# Patient Record
(demographics unavailable — no encounter records)

---

## 2025-04-29 NOTE — PHYSICAL EXAM
[No Acute Distress] : no acute distress [Normal Voice/Communication] : normal voice/communication [No Respiratory Distress] : no respiratory distress  [No Accessory Muscle Use] : no accessory muscle use [Normal] : affect was normal and insight and judgment were intact [de-identified] : Limited PE due to telephone visit.  [de-identified] : speaking in full sentences - speech is clear

## 2025-04-29 NOTE — ASSESSMENT
[FreeTextEntry1] : 71-year-old female with a history of hypertension, hyperlipidemia, CHF, atrial fibrillation (on apixaban), and a pacemaker presented to [facility] with worsening shortness of breath, gradually worsening lower extremity edema,  Pt presented with acute on chronic systolic congestive heart failure. Echo with EF 18%, reduced LV/RV function, overloaded RA, severe TR, moderate MR. S/p diuresis with lasix 40 IV BID at [facility], currently euvolemic, lungs clear and trace LE edema on PO lasix. Remains stable on RA. Entresto held at OSH for hypotension, GDMT optimized by HF team. Pt a/w pulmonary hypertension noted on TTE. Pt to f/u pulm Dr. Mittal outpt. Pt w/ascites, s/p large volume paracentesis of 12L on 4/4. Ascites likely iso of severe right-sided heart failure. Prior US showed liver wnl and cholelithiasis, LFTs monitored. Pt to f/u PCP outpt.

## 2025-04-29 NOTE — REVIEW OF SYSTEMS
[Vision Problems] : vision problems [Lower Ext Edema] : lower extremity edema [Fever] : no fever [Chills] : no chills [Fatigue] : no fatigue [Hearing Loss] : no hearing loss [Chest Pain] : no chest pain [Palpitations] : no palpitations [Shortness Of Breath] : no shortness of breath [Cough] : no cough [Abdominal Pain] : no abdominal pain [Nausea] : no nausea [Constipation] : no constipation [Vomiting] : no vomiting [Heartburn] : no heartburn [Incontinence] : no incontinence [Joint Pain] : no joint pain [Muscle Pain] : no muscle pain [Itching] : no itching [Skin Rash] : no skin rash [Headache] : no headache [Memory Loss] : no memory loss [Unsteady Walking] : no ataxia [Insomnia] : no insomnia [Anxiety] : no anxiety [Depression] : no depression [Easy Bleeding] : no easy bleeding [FreeTextEntry3] : Reading glasses [FreeTextEntry5] : decrease

## 2025-04-29 NOTE — HISTORY OF PRESENT ILLNESS
[Home] : at home, [unfilled] , at the time of the visit. [Other Location: e.g. Home (Enter Location, City,State)___] : at [unfilled] [Telephone (audio)] : This telephonic visit was provided via audio only technology. [Verbal consent obtained from patient] : the patient, [unfilled] [FreeTextEntry1] : F/u post hospitalization at [facility] from 4/13-15 for HF and PULM HTN  [de-identified] : 71-year-old female with a history of hypertension, hyperlipidemia, CHF, atrial fibrillation (on apixaban), and a pacemaker presented to [facility] with worsening shortness of breath, gradually worsening lower extremity edema, and abdominal distension for months iso of medication non-compliance, particularly with diuretics. Initial workup at [facility] showed ELIOT with creatinine of 1.51, and proBNP of 29,570.  Chest x-ray showed cardiomegaly and mild pulmonary congestion.  Echocardiogram revealed an LVEF of 18%, moderate mitral regurgitation, severe tricuspid regurgitation, severe pulmonary hypertension, and decreased right ventricular function.  A large left pleural effusion was also noted on echo. The patient was treated for acute on chronic systolic heart failure with IV Lasix BID, resulting in good diuresis and stabilization of creatinine. Underwent large volume paracentesis of 12L on 4/4. Addition of GDMT was limited due to hypotension. She was transferred [facility] for further heart failure and pulmonary hypertension evaluation by the respective specialty teams. Today the patient reports much improvement in her abdominal distention and LE edema from initial admission and has no acute concerns.  (13 Apr 2025 11:06) Hospital Course: Pt presented with acute on chronic systolic congestive heart failure. Echo with EF 18%, reduced LV/RV function, overloaded RA, severe TR, moderate MR. S/p diuresis with lasix 40 IV BID at [facility], currently euvolemic, lungs clear and trace LE edema on PO lasix. Remains stable on RA. Entresto held at OSH for hypotension, GDMT optimized by HF team. Pt a/w pulmonary hypertension noted on TTE. Pt to f/u pulm Dr. Mittal outpt. Pt w/ ascites,  s/p large volume paracentesis of 12L on 4/4. Ascites likely iso of severe right-sided heart failure. Prior US showed liver wnl and cholelithiasis, LFTs monitored. Pt to f/u PCP outpt.

## 2025-04-29 NOTE — PHYSICAL EXAM
[No Acute Distress] : no acute distress [Normal Voice/Communication] : normal voice/communication [No Respiratory Distress] : no respiratory distress  [No Accessory Muscle Use] : no accessory muscle use [Normal] : affect was normal and insight and judgment were intact [de-identified] : Limited PE due to telephone visit.  [de-identified] : speaking in full sentences - speech is clear

## 2025-05-01 NOTE — REVIEW OF SYSTEMS
[Fever] : no fever [Fatigue] : fatigue [Recent Wt Loss (___ Lbs)] : ~T recent [unfilled] lb weight loss [Epistaxis] : no epistaxis [Nasal Congestion] : no nasal congestion [Postnasal Drip] : no postnasal drip [Cough] : no cough [Hemoptysis] : no hemoptysis [Dyspnea] : dyspnea [Wheezing] : no wheezing [SOB on Exertion] : sob on exertion [Edema] : edema [Leg Cramps] : leg cramps [Orthopnea] : orthopnea [GERD] : gerd [Abdominal Pain] : abdominal pain [Nausea] : no nausea [Vomiting] : no vomiting [Hepatic Disease] : hepatic disease [Myalgias] : no myalgias [Anemia] : anemia [Headache] : no headache [Focal Weakness] : no focal weakness [Seizures] : no seizures [Head Injury] : no head injury [Dizziness] : dizziness [TextBox_69] : ascites - s/p paracentesis (12L don 4/4/25)

## 2025-05-01 NOTE — DISCUSSION/SUMMARY
[FreeTextEntry1] : Ms Delacruz is a 70 y/o with h/o HFrEF (dx'ed in 2024, follows at [place]), s/p ICD, atrial fibrillation (Eliquis), HTN, DLP, ascites (s/p paracentesis 4/4), pHTN is here today for hospital f/u for cardiomyopathy.   She is ACC/AHA Stage C, NYHA Class III, appears euvolemic, normotensive and her symptoms appear to be compounded by both her pHTN and her HFrEF and will need to optimize her GDMTs and volume status at this time.

## 2025-05-01 NOTE — HISTORY OF PRESENT ILLNESS
[Never] : never [Class III - Marked Limitation in Activity] : III [TextBox_4] : 71-year-old female with a history of hypertension, hyperlipidemia, CHF, atrial  fibrillation (on apixaban), and a pacemaker presented to [facility] with worsening shortness of breath, gradually worsening lower extremity edema, and abdominal distension for months iso of medication non-compliance, particularly with diuretics. Initial workup at [facility] showed ELIOT with creatinine of 1.51, and proBNP of 29,570. Chest x-ray showed cardiomegaly and mild pulmonary congestion. Echocardiogram revealed an LVEF of 18%, moderate mitral regurgitation, severe tricuspid regurgitation, severe pulmonary hypertension, and decreased right ventricular function. A large left pleural effusion was also noted on echo. The patient was treated for acute on chronic systolic heart failure with IV Lasix BID, resulting in good diuresis and stabilization of creatinine. Underwent large volume paracentesis of 12L on 4/4. Addition of GDMT was limited due to hypotension. She was transferred to [facility] for further heart failure and pulmonary hypertension evaluation  Lifelong nonsmoker, retired  Has 2 children- currently lives w/her son -requires assistance w/transport  4/2025 echo 1. Left ventricular systolic function is severely decreased with an ejection fraction visually estimated at <20 %.  2. Multiple segmental abnormalities exist. See findings.  3. Mildly enlarged right ventricular cavity size and reduced right ventricular systolic function. Tricuspid annular plane systolic excursion (TAPSE) is 1.3 cm (normal >=1.7 cm).  4. Right ventricular free wall strain is --7 %.  5. Moderate tricuspid regurgitation.  6. Bilateral pleural effusion noted.  7. Compared to the transthoracic echocardiogram performed on 4/3/2025, PASP is lower.  8. Estimated pulmonary artery systolic pressure is 51 mmHg, consistent with moderate pulmonary hypertension.  CXR 4/2025 The cardiac silhouette is enlarged. There is a left chest wall ICD with an intact lead with tip in expected region of the right ventricle. The generator obscures part of the left chest. Mildly elevated right hemidiaphragm again seen. There is no significant pulmonary vascular congestion. The visualized lungs show no focal lung consolidation. There are bilateral trace pleural effusions. There is no pneumothorax. There is no acute bony abnormality.  IMPRESSION: Enlarged cardiac silhouette. No significant pulmonary vascular congestion.   Accession:                             [id number] Collected Date/Time:                   4/4/2025 09:55 EDT Received Date/Time:                    4/4/2025 15:11 EDT  Non-Gynecologic Report - Auth (Verified)  Specimen(s) Submitted PERITONEAL FLUID  Final Diagnosis PERITONEAL FLUID NEGATIVE FOR MALIGNANT CELLS.  5/1/2025 feels better since hospitalization afib/PPM- on eliquis/metoprolol- evaluated by cards today Dr Lemus/ DONATO ONEILL , [place] Ascites persists but improved post paracentesis- remains on lasix and aldactone - would benefit from Hepatology evaluation reports mild RITTER, ambulates w/walker but is very limitted appetite fair, is losing weight

## 2025-05-01 NOTE — PHYSICAL EXAM
[No Acute Distress] : no acute distress [Well Developed] : well developed [Normal Oropharynx] : normal oropharynx [Normal Appearance] : normal appearance [Supple] : supple [Normal Rate/Rhythm] : normal rate/rhythm [Normal S1, S2] : normal s1, s2 [No Resp Distress] : no resp distress [No Acc Muscle Use] : no acc muscle use [Clear to Auscultation Bilaterally] : clear to auscultation bilaterally [Not Tender] : not tender [Soft] : soft [No Clubbing] : no clubbing [No Cyanosis] : no cyanosis [No Edema] : no edema [Normal Color/ Pigmentation] : normal color/ pigmentation [No Focal Deficits] : no focal deficits [Oriented x3] : oriented x3 [Normal Affect] : normal affect [TextBox_11] : NCAT, EOMI, anicteric, MMM, no thrush [TextBox_68] : no wheeze, [TextBox_89] : distended, +fluid wave

## 2025-05-01 NOTE — CARDIOLOGY SUMMARY
[de-identified] : -4/14/25 TTE: LVEF <20%, mildly enlarged RV size and reduced function, TAPSE 1.3cm, mod TR, PASP 51, small pericardial effusion, bilateral pleural effusion noted, IVC 1.1,   -4/3/25 TTE: EF 18%, global hypokinesis, reduced RV function, moderate MR, severe TR, severe pHTN (PASP 66mmHg)

## 2025-05-01 NOTE — PHYSICAL EXAM
[Frail] : frail [Normal] : clear lung fields, good air entry, no respiratory distress [No Edema] : no edema [No Cyanosis] : no cyanosis [No Rash] : no rash [No Skin Lesions] : no skin lesions [Moves all extremities] : moves all extremities [Alert and Oriented] : alert and oriented [de-identified] : JVP 8 cm H20 [de-identified] : distended [de-identified] : slow gait with walker

## 2025-05-01 NOTE — ASSESSMENT
[FreeTextEntry1] : 71-year-old female with a history of hypertension, hyperlipidemia, CHF, atrial fibrillation (on apixaban), and a pacemaker, severe pulm htn , ascites s/p paracentesis  1) pulm htn- will ultimately need cardiac cath (RHC/LHC) to evaluate pulmonary pressures -keep euvolemic- continue lasix, aldactone. Instructed on daily weight - will check serologic workup today 2) needs CTA chest to evaluate for underlying thromboembolic disease - no reported history of VTE 3) ascites/ eleavted LFTs - needs hepatology evaluation - appears to have continued ascites on evaluation though states abdomen is much improved 4) labs drawn in office today 5) cards f/u w/ Dr LOREDO 6) needs EOT, 6mwt and PFT - to evaluate for underlying obstructive/restrictive lung disease, diffusion impairment, and exertional capacity and to r/o hypoxemia 7) needs assistance w/transportation- provided letter for access-a-ride 8)  f/u in one month - Will need outside records - patient denies having had prior pulmonary follow-up -  Today's medical care services serve as the continuing focal point for needed health care services that are part of ongoing care related to a patient's one or more serious conditions or a complex condition.    The above plan was discussed with ADONIS CHADWICK  in detail. Patient verbalized understanding and agrees with plan as detailed above. Patient was provided education and counselling on current diagnosis/symptoms, diagnostic work up, treatment options and potential side effects of any prescribed therapy/therapies. ADONIS  was advised to call our clinic at [phone] for any new or worsening symptoms, or with any questions or concerns. In case of acute onset of respiratory symptoms or worsening presentation, patient was advised to present to nearest emergency room for further evaluation. ADONIS  expressed understanding and all questions/concerns were addressed.

## 2025-05-01 NOTE — ASSESSMENT
[FreeTextEntry1] : 71-year-old female with a history of hypertension, hyperlipidemia, CHF, atrial fibrillation (on apixaban), and a pacemaker, severe pulm htn , ascites s/p paracentesis  1) pulm htn- will ultimately need cardiac cath (RHC/LHC) to evaluate pulmonary pressures -keep euvolemic- continue lasix, aldactone. Instructed on daily weight - will check serologic workup today 2) needs CTA chest to evaluate for underlying thromboembolic disease - no reported history of VTE 3) ascites/ eleavted LFTs - needs hepatology evaluation - appears to have continued ascites on evaluation though states abdomen is much improved 4) labs drawn in office today 5) cards f/u w/ Dr LOREDO 6) needs EOT, 6mwt and PFT - to evaluate for underlying obstructive/restrictive lung disease, diffusion impairment, and exertional capacity and to r/o hypoxemia 7) needs assistance w/transportation- provided letter for access-a-ride 8)  f/u in one month - Will need outside records - patient denies having had prior pulmonary follow-up -  Today's medical care services serve as the continuing focal point for needed health care services that are part of ongoing care related to a patient's one or more serious conditions or a complex condition.    The above plan was discussed with ADONIS CHADWICK  in detail. Patient verbalized understanding and agrees with plan as detailed above. Patient was provided education and counselling on current diagnosis/symptoms, diagnostic work up, treatment options and potential side effects of any prescribed therapy/therapies. ADONIS  was advised to call our clinic at [phone] for any new or worsening symptoms, or with any questions or concerns. In case of acute onset of respiratory symptoms or worsening presentation, patient was advised to present to nearest emergency room for further evaluation. ADONIS  expressed understanding and all questions/concerns were addressed.

## 2025-05-01 NOTE — END OF VISIT
[FreeTextEntry3] :  I, Dr. Garland Choi, personally performed the evaluation and management (E/M) services for this established patient who presents today with (a) new problem(s)/exacerbation of (an) existing condition(s). That E/M includes conducting the clinically appropriate interval history &/or exam, assessing all new/exacerbated conditions, and establishing a new plan of care. Today, my JOSSE, Sherry Roy NP, was here to observe my evaluation and management service for this new problem/exacerbated condition and follow the plan of care established by me going forward.   My cumulative time spent on today's visit included: Preparation for the visit, review of the medical records, review of pertinent diagnostic studies, review and integration of laboratory data, coordination of care, examination and counseling of the patient on the above diagnosis, treatment plan and prognosis, orders of diagnostic tests and any additional lab data ordered today, medications and/or appropriate procedures and documentation in the medical records of today's visit.  [Time Spent: ___ minutes] : I have spent [unfilled] minutes of time on the encounter which excludes teaching and separately reported services.

## 2025-05-01 NOTE — HISTORY OF PRESENT ILLNESS
[Never] : never [Class III - Marked Limitation in Activity] : III [TextBox_4] : 71-year-old female with a history of hypertension, hyperlipidemia, CHF, atrial  fibrillation (on apixaban), and a pacemaker presented to [facility] with worsening shortness of breath, gradually worsening lower extremity edema, and abdominal distension for months iso of medication non-compliance, particularly with diuretics. Initial workup at [facility] showed ELIOT with creatinine of 1.51, and proBNP of 29,570. Chest x-ray showed cardiomegaly and mild pulmonary congestion. Echocardiogram revealed an LVEF of 18%, moderate mitral regurgitation, severe tricuspid regurgitation, severe pulmonary hypertension, and decreased right ventricular function. A large left pleural effusion was also noted on echo. The patient was treated for acute on chronic systolic heart failure with IV Lasix BID, resulting in good diuresis and stabilization of creatinine. Underwent large volume paracentesis of 12L on 4/4. Addition of GDMT was limited due to hypotension. She was transferred to [facility] for further heart failure and pulmonary hypertension evaluation  Lifelong nonsmoker, retired  Has 2 children- currently lives w/her son -requires assistance w/transport  4/2025 echo 1. Left ventricular systolic function is severely decreased with an ejection fraction visually estimated at <20 %.  2. Multiple segmental abnormalities exist. See findings.  3. Mildly enlarged right ventricular cavity size and reduced right ventricular systolic function. Tricuspid annular plane systolic excursion (TAPSE) is 1.3 cm (normal >=1.7 cm).  4. Right ventricular free wall strain is --7 %.  5. Moderate tricuspid regurgitation.  6. Bilateral pleural effusion noted.  7. Compared to the transthoracic echocardiogram performed on 4/3/2025, PASP is lower.  8. Estimated pulmonary artery systolic pressure is 51 mmHg, consistent with moderate pulmonary hypertension.  CXR 4/2025 The cardiac silhouette is enlarged. There is a left chest wall ICD with an intact lead with tip in expected region of the right ventricle. The generator obscures part of the left chest. Mildly elevated right hemidiaphragm again seen. There is no significant pulmonary vascular congestion. The visualized lungs show no focal lung consolidation. There are bilateral trace pleural effusions. There is no pneumothorax. There is no acute bony abnormality.  IMPRESSION: Enlarged cardiac silhouette. No significant pulmonary vascular congestion.   Accession:                             [id number] Collected Date/Time:                   4/4/2025 09:55 EDT Received Date/Time:                    4/4/2025 15:11 EDT  Non-Gynecologic Report - Auth (Verified)  Specimen(s) Submitted PERITONEAL FLUID  Final Diagnosis PERITONEAL FLUID NEGATIVE FOR MALIGNANT CELLS.  5/1/2025 feels better since hospitalization afib/PPM- on eliquis/metoprolol- evaluated by cards today Dr Lemus/ DONATO ONEILL , [place] Ascites persists but improved post paracentesis- remains on lasix and aldactone - would benefit from Hepatology evaluation reports mild RITTER, ambulates w/walker but is very limitted appetite fair, is losing weight

## 2025-05-01 NOTE — HISTORY OF PRESENT ILLNESS
[FreeTextEntry1] : Ms Delacruz is a 72 y/o with h/o HFrEF (dx'ed in 2024, follows at [place]), s/p ICD, atrial fibrillation (Eliquis), HTN, DLP, ascites (s/p paracentesis 4/4), is here today for hospital f/u for cardiomyopathy.   HPI: She initially presented to [facility] on 4/3 with worsening LE edema, abdominal distension and SOB. She states that she has been following with an outpt cardiologist (she doesn't recall his name). She has not been taking meds consistently. Her admission work up was remarkable for creatinine 1.5, pBNP 29,570, cardiomegaly on his CxR and mild pulm congestion. A TTE demonstrated LVEF 18% with biventricular dysfunction, mod MR, sev TR and severe pulm HTN. She was admitted and treated with IV diuretics with good response. She also underwent large volume paracentesis  of 12 liters on 4/4. Her hospital course was complicated by hypotension which triggered a transfer to [facility] 4/13. TTE shows pulmonary hypertension. Optimized on GDMT and diuretics. LFTs rising, prior US showed liver wnl and cholelithiasis.  Patient was d/c on 4/15 GDMTs: Toprol XL 25mg QD, spironolactone 25mg QD, losartan 12.5mg QD, lasix 40mg QD, farxiga 10mg QD. Labs 4/15/25: K3.6, BUN/Cr 20/0.96,  Discharge weight on 4/15 - 123.2lb  Since discharge, patient states that she still has ongoing RITTER and fatigue and that she can walk around her house with walker, but states that she would need to stop frequently d/t fatigue and SOB. Still has abdominal distention, but she notes that it is improved prior to her hospitalization and does not note it is progressing. She sleeps with 2 pillows at night. Does not check her SBP or weight at home. Her weight today in clinic is 117lb. Med rec reviewed and of note she is not taking atorvastatin and ezetimibe and losartan.   She denies any CP/palpitation, LH/dizziness, orthopnea, PND or BLE edema. Denies any tobacco, ETOH or drug use. Retired . Denies any HF hospitalization prior to this recent.

## 2025-05-01 NOTE — ASSESSMENT
[FreeTextEntry1] : #HFrEF -Etiology: unclear baseline of her cardiomyopathy? was diagnosed in 2024 and got ICD placed at [place] -GDMT: currently taking Toprol XL 25mg QD, farxiga 10mg QD and spironolactone 25mg QD. Will resume losartan 12.5mg QD - will uptitrate slowly d/t hypotension episodes in hospital and eventual switch to ARNi -Diuretics: c/w lasix 40mg QD for maintenence of euvolemia -Device: s/p ICD (2024) -Labs 4/15/25: K3.6, BUN/Cr 20/0.96, 26589 -will need repeat labs on reinitiating ARBs -Emphasize importance of medication compliance and to monitor diet with high salt and high K -Will need to obtain records of her cardiac hx from [place]. Of note - patient would like to continue her care in [facility] moving forward. Will send out referrals for other specialties  #pHTN -f/u with pulm this afternoon for further management/testing  #Afib -on eliquis -follows with EP at [facility]? - will need records to be sent -Will send out referral for EP in network as patient wants to consolidate care at [facility]  #Ascites/Elevated LFTs -LFTs 4/15: , ,  -s/p paracentesis 4/4 -also will f/u with hepatology - referral sent out today -off atorvastatin and zetia i/s/o of elevated LFTs  Will f/u with Dr. Thomas in 3 weeks

## 2025-05-05 NOTE — REVIEW OF SYSTEMS
[Negative] : Heme/Lymph
SBIRT referral

## 2025-05-19 NOTE — HEALTH RISK ASSESSMENT
[0] : 2) Feeling down, depressed, or hopeless: Not at all (0) [PHQ-2 Negative - No further assessment needed] : PHQ-2 Negative - No further assessment needed [Never] : Never [No] : In the past 12 months have you used drugs other than those required for medical reasons? No [No falls in past year] : Patient reported no falls in the past year [[id number]] : 0

## 2025-05-19 NOTE — HEALTH RISK ASSESSMENT
[0] : 2) Feeling down, depressed, or hopeless: Not at all (0) [PHQ-2 Negative - No further assessment needed] : PHQ-2 Negative - No further assessment needed [Never] : Never [No] : In the past 12 months have you used drugs other than those required for medical reasons? No [No falls in past year] : Patient reported no falls in the past year [[id number]] : 0

## 2025-05-19 NOTE — PHYSICAL EXAM
[No Acute Distress] : no acute distress [Well Nourished] : well nourished [Well Developed] : well developed [Well-Appearing] : well-appearing [Normal Sclera/Conjunctiva] : normal sclera/conjunctiva [PERRL] : pupils equal round and reactive to light [EOMI] : extraocular movements intact [Normal Outer Ear/Nose] : the outer ears and nose were normal in appearance [Normal Oropharynx] : the oropharynx was normal [No JVD] : no jugular venous distention [No Lymphadenopathy] : no lymphadenopathy [Supple] : supple [Thyroid Normal, No Nodules] : the thyroid was normal and there were no nodules present [No Respiratory Distress] : no respiratory distress  [No Accessory Muscle Use] : no accessory muscle use [Clear to Auscultation] : lungs were clear to auscultation bilaterally [Normal Rate] : normal rate  [Regular Rhythm] : with a regular rhythm [Normal S1, S2] : normal S1 and S2 [No Murmur] : no murmur heard [No Carotid Bruits] : no carotid bruits [No Varicosities] : no varicosities [Pedal Pulses Present] : the pedal pulses are present [No Edema] : there was no peripheral edema [No Extremity Clubbing/Cyanosis] : no extremity clubbing/cyanosis [Soft] : abdomen soft [Non Tender] : non-tender [Non-distended] : non-distended [Normal Bowel Sounds] : normal bowel sounds [Normal Posterior Cervical Nodes] : no posterior cervical lymphadenopathy [Normal Anterior Cervical Nodes] : no anterior cervical lymphadenopathy [No CVA Tenderness] : no CVA  tenderness [No Spinal Tenderness] : no spinal tenderness [No Joint Swelling] : no joint swelling [Grossly Normal Strength/Tone] : grossly normal strength/tone [No Rash] : no rash [Coordination Grossly Intact] : coordination grossly intact [No Focal Deficits] : no focal deficits [Normal Gait] : normal gait [Normal Affect] : the affect was normal [Normal Insight/Judgement] : insight and judgment were intact [Alert and Oriented x3] : oriented to person, place, and time [de-identified] : mild-mod ascites, umbilical herni  reducible

## 2025-05-19 NOTE — HEALTH RISK ASSESSMENT
[0] : 2) Feeling down, depressed, or hopeless: Not at all (0) [PHQ-2 Negative - No further assessment needed] : PHQ-2 Negative - No further assessment needed [Never] : Never [No] : In the past 12 months have you used drugs other than those required for medical reasons? No [No falls in past year] : Patient reported no falls in the past year [[id number]] : 0

## 2025-05-19 NOTE — HISTORY OF PRESENT ILLNESS
[FreeTextEntry1] : establish care annual physical  [de-identified] : Ms. CHADWICK is a 71 year old F with PMHx of HTN, HLD, CHF, pacemaker who presents today for new patient eval and establish care. Pt s/p hospital visit 4/13-4/15 for worsening sob, LE edema, abd distension, after months of med non-compliance. CXR showed cardiomegaly and mild pulmonary congestion.  Echocardiogram revealed an LVEF of 18%, moderate mitral regurgitation, severe tricuspid regurgitation, severe pulmonary hypertension, and decreased right ventricular function.  A large left pleural effusion was also noted on echo. Pt was given IV Lasix in ER and underwent large volume paracentesis of 12L on 4/4 due to massive ascites. Pt reports abd distension has greatly improved since hospital stay. Denies chest pain, sob, tsang, dizziness, diaphoresis, palpitations, LE swelling, orthopnea, syncope, n/v, headache. Is following with cards and pulm since hospital stay

## 2025-05-19 NOTE — HISTORY OF PRESENT ILLNESS
[FreeTextEntry1] : establish care annual physical  [de-identified] : Ms. CHADWICK is a 71 year old F with PMHx of HTN, HLD, CHF, pacemaker who presents today for new patient eval and establish care. Pt s/p hospital visit 4/13-4/15 for worsening sob, LE edema, abd distension, after months of med non-compliance. CXR showed cardiomegaly and mild pulmonary congestion.  Echocardiogram revealed an LVEF of 18%, moderate mitral regurgitation, severe tricuspid regurgitation, severe pulmonary hypertension, and decreased right ventricular function.  A large left pleural effusion was also noted on echo. Pt was given IV Lasix in ER and underwent large volume paracentesis of 12L on 4/4 due to massive ascites. Pt reports abd distension has greatly improved since hospital stay. Denies chest pain, sob, tsang, dizziness, diaphoresis, palpitations, LE swelling, orthopnea, syncope, n/v, headache. Is following with cards and pulm since hospital stay

## 2025-05-19 NOTE — HISTORY OF PRESENT ILLNESS
[FreeTextEntry1] : establish care annual physical  [de-identified] : Ms. CHADWICK is a 71 year old F with PMHx of HTN, HLD, CHF, pacemaker who presents today for new patient eval and establish care. Pt s/p hospital visit 4/13-4/15 for worsening sob, LE edema, abd distension, after months of med non-compliance. CXR showed cardiomegaly and mild pulmonary congestion.  Echocardiogram revealed an LVEF of 18%, moderate mitral regurgitation, severe tricuspid regurgitation, severe pulmonary hypertension, and decreased right ventricular function.  A large left pleural effusion was also noted on echo. Pt was given IV Lasix in ER and underwent large volume paracentesis of 12L on 4/4 due to massive ascites. Pt reports abd distension has greatly improved since hospital stay. Denies chest pain, sob, tsang, dizziness, diaphoresis, palpitations, LE swelling, orthopnea, syncope, n/v, headache. Is following with cards and pulm since hospital stay

## 2025-05-19 NOTE — HISTORY OF PRESENT ILLNESS
[FreeTextEntry1] : establish care annual physical  [de-identified] : Ms. CHADWICK is a 71 year old F with PMHx of HTN, HLD, CHF, pacemaker who presents today for new patient eval and establish care. Pt s/p hospital visit 4/13-4/15 for worsening sob, LE edema, abd distension, after months of med non-compliance. CXR showed cardiomegaly and mild pulmonary congestion.  Echocardiogram revealed an LVEF of 18%, moderate mitral regurgitation, severe tricuspid regurgitation, severe pulmonary hypertension, and decreased right ventricular function.  A large left pleural effusion was also noted on echo. Pt was given IV Lasix in ER and underwent large volume paracentesis of 12L on 4/4 due to massive ascites. Pt reports abd distension has greatly improved since hospital stay. Denies chest pain, sob, tsang, dizziness, diaphoresis, palpitations, LE swelling, orthopnea, syncope, n/v, headache. Is following with cards and pulm since hospital stay

## 2025-05-19 NOTE — PHYSICAL EXAM
[No Acute Distress] : no acute distress [Well Nourished] : well nourished [Well Developed] : well developed [Well-Appearing] : well-appearing [Normal Sclera/Conjunctiva] : normal sclera/conjunctiva [PERRL] : pupils equal round and reactive to light [EOMI] : extraocular movements intact [Normal Outer Ear/Nose] : the outer ears and nose were normal in appearance [Normal Oropharynx] : the oropharynx was normal [No JVD] : no jugular venous distention [No Lymphadenopathy] : no lymphadenopathy [Supple] : supple [Thyroid Normal, No Nodules] : the thyroid was normal and there were no nodules present [No Respiratory Distress] : no respiratory distress  [No Accessory Muscle Use] : no accessory muscle use [Clear to Auscultation] : lungs were clear to auscultation bilaterally [Normal Rate] : normal rate  [Regular Rhythm] : with a regular rhythm [Normal S1, S2] : normal S1 and S2 [No Murmur] : no murmur heard [No Carotid Bruits] : no carotid bruits [No Varicosities] : no varicosities [Pedal Pulses Present] : the pedal pulses are present [No Edema] : there was no peripheral edema [No Extremity Clubbing/Cyanosis] : no extremity clubbing/cyanosis [Soft] : abdomen soft [Non Tender] : non-tender [Non-distended] : non-distended [Normal Bowel Sounds] : normal bowel sounds [Normal Posterior Cervical Nodes] : no posterior cervical lymphadenopathy [Normal Anterior Cervical Nodes] : no anterior cervical lymphadenopathy [No CVA Tenderness] : no CVA  tenderness [No Spinal Tenderness] : no spinal tenderness [No Joint Swelling] : no joint swelling [Grossly Normal Strength/Tone] : grossly normal strength/tone [No Rash] : no rash [Coordination Grossly Intact] : coordination grossly intact [No Focal Deficits] : no focal deficits [Normal Gait] : normal gait [Normal Affect] : the affect was normal [Normal Insight/Judgement] : insight and judgment were intact [Alert and Oriented x3] : oriented to person, place, and time [de-identified] : mild-mod ascites, umbilical herni  reducible

## 2025-05-19 NOTE — PHYSICAL EXAM
[No Acute Distress] : no acute distress [Well Nourished] : well nourished [Well Developed] : well developed [Well-Appearing] : well-appearing [Normal Sclera/Conjunctiva] : normal sclera/conjunctiva [PERRL] : pupils equal round and reactive to light [EOMI] : extraocular movements intact [Normal Outer Ear/Nose] : the outer ears and nose were normal in appearance [Normal Oropharynx] : the oropharynx was normal [No JVD] : no jugular venous distention [No Lymphadenopathy] : no lymphadenopathy [Supple] : supple [Thyroid Normal, No Nodules] : the thyroid was normal and there were no nodules present [No Respiratory Distress] : no respiratory distress  [No Accessory Muscle Use] : no accessory muscle use [Clear to Auscultation] : lungs were clear to auscultation bilaterally [Normal Rate] : normal rate  [Regular Rhythm] : with a regular rhythm [Normal S1, S2] : normal S1 and S2 [No Murmur] : no murmur heard [No Carotid Bruits] : no carotid bruits [No Varicosities] : no varicosities [Pedal Pulses Present] : the pedal pulses are present [No Edema] : there was no peripheral edema [No Extremity Clubbing/Cyanosis] : no extremity clubbing/cyanosis [Soft] : abdomen soft [Non Tender] : non-tender [Non-distended] : non-distended [Normal Bowel Sounds] : normal bowel sounds [Normal Posterior Cervical Nodes] : no posterior cervical lymphadenopathy [Normal Anterior Cervical Nodes] : no anterior cervical lymphadenopathy [No CVA Tenderness] : no CVA  tenderness [No Spinal Tenderness] : no spinal tenderness [No Joint Swelling] : no joint swelling [Grossly Normal Strength/Tone] : grossly normal strength/tone [No Rash] : no rash [Coordination Grossly Intact] : coordination grossly intact [No Focal Deficits] : no focal deficits [Normal Gait] : normal gait [Normal Affect] : the affect was normal [Normal Insight/Judgement] : insight and judgment were intact [Alert and Oriented x3] : oriented to person, place, and time [de-identified] : mild-mod ascites, umbilical herni  reducible

## 2025-05-19 NOTE — PHYSICAL EXAM
[No Acute Distress] : no acute distress [Well Nourished] : well nourished [Well Developed] : well developed [Well-Appearing] : well-appearing [Normal Sclera/Conjunctiva] : normal sclera/conjunctiva [PERRL] : pupils equal round and reactive to light [EOMI] : extraocular movements intact [Normal Outer Ear/Nose] : the outer ears and nose were normal in appearance [Normal Oropharynx] : the oropharynx was normal [No JVD] : no jugular venous distention [No Lymphadenopathy] : no lymphadenopathy [Supple] : supple [Thyroid Normal, No Nodules] : the thyroid was normal and there were no nodules present [No Respiratory Distress] : no respiratory distress  [No Accessory Muscle Use] : no accessory muscle use [Clear to Auscultation] : lungs were clear to auscultation bilaterally [Normal Rate] : normal rate  [Regular Rhythm] : with a regular rhythm [Normal S1, S2] : normal S1 and S2 [No Murmur] : no murmur heard [No Carotid Bruits] : no carotid bruits [No Varicosities] : no varicosities [Pedal Pulses Present] : the pedal pulses are present [No Edema] : there was no peripheral edema [No Extremity Clubbing/Cyanosis] : no extremity clubbing/cyanosis [Soft] : abdomen soft [Non Tender] : non-tender [Non-distended] : non-distended [Normal Bowel Sounds] : normal bowel sounds [Normal Posterior Cervical Nodes] : no posterior cervical lymphadenopathy [Normal Anterior Cervical Nodes] : no anterior cervical lymphadenopathy [No CVA Tenderness] : no CVA  tenderness [No Spinal Tenderness] : no spinal tenderness [No Joint Swelling] : no joint swelling [Grossly Normal Strength/Tone] : grossly normal strength/tone [No Rash] : no rash [Coordination Grossly Intact] : coordination grossly intact [No Focal Deficits] : no focal deficits [Normal Gait] : normal gait [Normal Affect] : the affect was normal [Normal Insight/Judgement] : insight and judgment were intact [Alert and Oriented x3] : oriented to person, place, and time [de-identified] : mild-mod ascites, umbilical herni  reducible

## 2025-05-19 NOTE — HISTORY OF PRESENT ILLNESS
[FreeTextEntry1] : establish care annual physical  [de-identified] : Ms. CHADWICK is a 71 year old F with PMHx of HTN, HLD, CHF, pacemaker who presents today for new patient eval and establish care. Pt s/p hospital visit 4/13-4/15 for worsening sob, LE edema, abd distension, after months of med non-compliance. CXR showed cardiomegaly and mild pulmonary congestion.  Echocardiogram revealed an LVEF of 18%, moderate mitral regurgitation, severe tricuspid regurgitation, severe pulmonary hypertension, and decreased right ventricular function.  A large left pleural effusion was also noted on echo. Pt was given IV Lasix in ER and underwent large volume paracentesis of 12L on 4/4 due to massive ascites. Pt reports abd distension has greatly improved since hospital stay. Denies chest pain, sob, tsang, dizziness, diaphoresis, palpitations, LE swelling, orthopnea, syncope, n/v, headache. Is following with cards and pulm since hospital stay

## 2025-05-19 NOTE — HEALTH RISK ASSESSMENT
[0] : 2) Feeling down, depressed, or hopeless: Not at all (0) [PHQ-2 Negative - No further assessment needed] : PHQ-2 Negative - No further assessment needed [Never] : Never [No] : In the past 12 months have you used drugs other than those required for medical reasons? No [No falls in past year] : Patient reported no falls in the past year [[id number]] : 0

## 2025-05-19 NOTE — HEALTH RISK ASSESSMENT
[0] : 2) Feeling down, depressed, or hopeless: Not at all (0) [PHQ-2 Negative - No further assessment needed] : PHQ-2 Negative - No further assessment needed [Never] : Never [No] : In the past 12 months have you used drugs other than those required for medical reasons? No [No falls in past year] : Patient reported no falls in the past year [[id number]] : 0

## 2025-05-19 NOTE — ADDENDUM
[FreeTextEntry1] : This note was written by Corie Sanders on 05/19/2025 acting as medical scribe for Dr. Navid Koenig. I, Dr. Navid Koenig, have read and attest that all the information, medical decision making and discharge instructions within are true and accurate.

## 2025-05-19 NOTE — PHYSICAL EXAM
[No Acute Distress] : no acute distress [Well Nourished] : well nourished [Well Developed] : well developed [Well-Appearing] : well-appearing [Normal Sclera/Conjunctiva] : normal sclera/conjunctiva [PERRL] : pupils equal round and reactive to light [EOMI] : extraocular movements intact [Normal Outer Ear/Nose] : the outer ears and nose were normal in appearance [Normal Oropharynx] : the oropharynx was normal [No JVD] : no jugular venous distention [No Lymphadenopathy] : no lymphadenopathy [Supple] : supple [Thyroid Normal, No Nodules] : the thyroid was normal and there were no nodules present [No Respiratory Distress] : no respiratory distress  [No Accessory Muscle Use] : no accessory muscle use [Clear to Auscultation] : lungs were clear to auscultation bilaterally [Normal Rate] : normal rate  [Regular Rhythm] : with a regular rhythm [Normal S1, S2] : normal S1 and S2 [No Murmur] : no murmur heard [No Carotid Bruits] : no carotid bruits [No Varicosities] : no varicosities [Pedal Pulses Present] : the pedal pulses are present [No Edema] : there was no peripheral edema [No Extremity Clubbing/Cyanosis] : no extremity clubbing/cyanosis [Soft] : abdomen soft [Non Tender] : non-tender [Non-distended] : non-distended [Normal Bowel Sounds] : normal bowel sounds [Normal Posterior Cervical Nodes] : no posterior cervical lymphadenopathy [Normal Anterior Cervical Nodes] : no anterior cervical lymphadenopathy [No CVA Tenderness] : no CVA  tenderness [No Spinal Tenderness] : no spinal tenderness [No Joint Swelling] : no joint swelling [Grossly Normal Strength/Tone] : grossly normal strength/tone [No Rash] : no rash [Coordination Grossly Intact] : coordination grossly intact [No Focal Deficits] : no focal deficits [Normal Gait] : normal gait [Normal Affect] : the affect was normal [Normal Insight/Judgement] : insight and judgment were intact [Alert and Oriented x3] : oriented to person, place, and time [de-identified] : mild-mod ascites, umbilical herni  reducible

## 2025-05-29 NOTE — PHYSICAL EXAM
[Well Developed] : well developed [Well Nourished] : well nourished [No Acute Distress] : no acute distress [Normal Conjunctiva] : normal conjunctiva [Normal Venous Pressure] : normal venous pressure [No Carotid Bruit] : no carotid bruit [Normal S1, S2] : normal S1, S2 [No Murmur] : no murmur [No Rub] : no rub [No Gallop] : no gallop [Clear Lung Fields] : clear lung fields [Good Air Entry] : good air entry [No Respiratory Distress] : no respiratory distress  [Non Tender] : non-tender [No Masses/organomegaly] : no masses/organomegaly [Normal Bowel Sounds] : normal bowel sounds [Normal Gait] : normal gait [No Edema] : no edema [No Cyanosis] : no cyanosis [No Clubbing] : no clubbing [No Varicosities] : no varicosities [No Rash] : no rash [No Skin Lesions] : no skin lesions [Moves all extremities] : moves all extremities [No Focal Deficits] : no focal deficits [Normal Speech] : normal speech [Alert and Oriented] : alert and oriented [Normal memory] : normal memory [de-identified] : ascites present, distended

## 2025-05-29 NOTE — DISCUSSION/SUMMARY
[FreeTextEntry1] : 71 year old woman with history of HFrEF (diagnosed at [place] in 2024), ICD in place, AFib (Eliquis), HTN who is here to establish care with both general cardio and EP clinic. She was diuresed and had paracentesis during admission to [facility] in April. She still has abdominal distension but states it is better. SHe missed her hepatology appointment but has appointment 6/4 with HF doctor  #Chronic systolic Heart failure- Patient states that her distension is better however her abominal exam still shows significant fluid- will have her f/u with hepatology given the ascites Increase lasix to BID Continue Aldactone/Toprol/Farxiga Will change losartan to full tablet daily  #EP followup for Afib- continue eliquis  #FU in 3 months [EKG obtained to assist in diagnosis and management of assessed problem(s)] : EKG obtained to assist in diagnosis and management of assessed problem(s)

## 2025-05-29 NOTE — HISTORY OF PRESENT ILLNESS
[FreeTextEntry1] : 71 year old woman with history of HFrEF (diagnosed at [place] in 2024), ICD in place, AFib (Eliquis), HTN who is here to establish care with both general cardio and EP clinic. She was diuresed and had paracentesis during admission to [place] in April. She still has abdominal distension but states it is better. SHe missed her hepatology appointment but has appointment 6/4 with HF doctor There is some confusion regarding meds but her list is as follows:  Meds:  Eliquis 5 mg BID Farxiga 10 mg daily Lasix 40 mg daily Losartan 12.5 mg daily Toprol 25 mg Daily Aldactone 25 mg daily  TTE 4/14/25: 1. Left ventricular systolic function is severely decreased with an ejection fraction visually estimated at <20 %.  2. Multiple segmental abnormalities exist. See findings.  3. Mildly enlarged right ventricular cavity size and reduced right ventricular systolic function. Tricuspid annular plane systolic excursion (TAPSE) is 1.3 cm (normal >=1.7 cm).  4. Right ventricular free wall strain is --7 %.  5. Moderate tricuspid regurgitation.  6. Bilateral pleural effusion noted.  7. Compared to the transthoracic echocardiogram performed on 4/3/2025, PASP is lower.  8. Estimated pulmonary artery systolic pressure is 51 mmHg, consistent with moderate pulmonary hypertension. *The apex is dyskinetic. The mid and apical anterior wall, mid and apical anterior septum, mid and apical inferior septum, mid and apical inferior wall, mid inferolateral segment, and apical lateral segment are akinetic. The basal anteroseptal segment, basal inferoseptal segment, basal inferolateral segment, mid anterolateral segment, basal anterior segment, and basal inferior segment are hypokinetic. All remaining scored segments are normal.  #Chronic systolic Heart failure- Patient states that her distension is better however her abominal exam still shows significant fluid- will have her f/u with hepatology given the ascites Increase lasix to BID Continue Aldactone/Toprol/Farxiga Will change losartan to full tablet daily  #EP followup for Afib- continue eliquis

## 2025-05-30 NOTE — DISCUSSION/SUMMARY
[EKG obtained to assist in diagnosis and management of assessed problem(s)] : EKG obtained to assist in diagnosis and management of assessed problem(s) [FreeTextEntry1] : Martha Delacruz is a 71 year old woman w/paroxysmal atrial fibrillation (Eliquis), HFrEF 2/2 NICM (<20%) s/p ICD (Biotronik 2024), HTN, DLP, ascites s/p paracentesis (4/4) who presents for an initial evaluation of her atrial fibrillation and ICD.  Impression:  1. AF:  EKG performed today to assess for presence of conduction disease, pre-excitation or atrial fibrillation reveals sinus rhythm.  Discussed treatment options for afib including rate control vs antiarrhythmics vs possible ablation. Given recurrent symptomatic afib despite rate control management, history of heart failure with severely reduced LVEF and preference to avoid antiarrhythmics, recommend undergoing possible afib ablation. Literature provided. For now, continue with Eliquis for stroke prophylaxis and metoprolol for rate control.   2. s/p ICD (2024) at [place]: Device check today.   3.  Chronic systolic CHF: NYHA Class II symptoms. Resume OMT as prescribed, encouraged heart healthy diet, daily weight, and regular f/u with Cardiology/Heart failure team as scheduled.  RTO for f/u in 3 months

## 2025-05-30 NOTE — CARDIOLOGY SUMMARY
[de-identified] : 5/29/25: sinus rhythm at 95bpm [de-identified] :  -4/14/25 TTE: LVEF <20%, mildly enlarged RV size and reduced function, TAPSE 1.3cm, mod TR, PASP 51, small pericardial effusion, bilateral pleural effusion noted, IVC 1.1,  -4/3/25 TTE: EF 18%, global hypokinesis, reduced RV function, moderate MR, severe TR, severe pHTN (PASP 66mmHg)

## 2025-05-30 NOTE — CARDIOLOGY SUMMARY
[de-identified] : 5/29/25: sinus rhythm at 95bpm [de-identified] :  -4/14/25 TTE: LVEF <20%, mildly enlarged RV size and reduced function, TAPSE 1.3cm, mod TR, PASP 51, small pericardial effusion, bilateral pleural effusion noted, IVC 1.1,  -4/3/25 TTE: EF 18%, global hypokinesis, reduced RV function, moderate MR, severe TR, severe pHTN (PASP 66mmHg)

## 2025-05-30 NOTE — CARDIOLOGY SUMMARY
[de-identified] : 5/29/25: sinus rhythm at 95bpm [de-identified] :  -4/14/25 TTE: LVEF <20%, mildly enlarged RV size and reduced function, TAPSE 1.3cm, mod TR, PASP 51, small pericardial effusion, bilateral pleural effusion noted, IVC 1.1,  -4/3/25 TTE: EF 18%, global hypokinesis, reduced RV function, moderate MR, severe TR, severe pHTN (PASP 66mmHg)

## 2025-05-30 NOTE — DISCUSSION/SUMMARY
[EKG obtained to assist in diagnosis and management of assessed problem(s)] : EKG obtained to assist in diagnosis and management of assessed problem(s) [FreeTextEntry1] : Martha Delacruz is a 71 year old woman w/paroxysmal atrial fibrillation (Eliquis), HFrEF 2/2 NICM (<20%) s/p ICD (Biotronik 2024), HTN, DLP, ascites s/p paracentesis (4/4) who presents for an initial evaluation of her atrial fibrillation and ICD.  Impression:  1. AF:  EKG performed today to assess for presence of conduction disease, pre-excitation or atrial fibrillation reveals sinus rhythm.  Discussed treatment options for afib including rate control vs antiarrhythmics vs possible ablation. Given recurrent symptomatic afib despite rate control management, history of heart failure with severely reduced LVEF and preference to avoid antiarrhythmics, recommend undergoing possible afib ablation. Literature provided. For now, continue with Eliquis for stroke prophylaxis and metoprolol for rate control.   2. s/p ICD (2024) at [place]: Device check today.   3.  Chronic systolic CHF: NYHA Class II symptoms. Resume OMT as prescribed, encouraged heart healthy diet, daily weight, and regular f/u with Cardiology/Heart failure team as scheduled.  RTO for f/u in 3 months

## 2025-05-30 NOTE — DISCUSSION/SUMMARY
[EKG obtained to assist in diagnosis and management of assessed problem(s)] : EKG obtained to assist in diagnosis and management of assessed problem(s) [FreeTextEntry1] : Martha Delacruz is a 71 year old woman w/paroxysmal atrial fibrillation (Eliquis), HFrEF 2/2 NICM (<20%) s/p ICD (Biotronik 2024), HTN, DLP, ascites s/p paracentesis (4/4) who presents for an initial evaluation of her atrial fibrillation and ICD.  Impression:  1. AF:  EKG performed today to assess for presence of conduction disease, pre-excitation or atrial fibrillation reveals sinus rhythm.  Discussed treatment options for afib including rate control vs antiarrhythmics vs possible ablation. Given recurrent symptomatic afib despite rate control management, history of heart failure with severely reduced LVEF and preference to avoid antiarrhythmics, recommend undergoing possible afib ablation. Literature provided. For now, continue with Eliquis for stroke prophylaxis and metoprolol for rate control.   2. s/p ICD (2024) at [place]: Device check today.   3.  Chronic systolic CHF: NYHA Class II symptoms. Resume OMT as prescribed, encouraged heart healthy diet, daily weight, and regular f/u with Cardiology/Heart failure team as scheduled.  RTO for f/u in 3 months

## 2025-05-30 NOTE — HISTORY OF PRESENT ILLNESS
[FreeTextEntry1] : Martha Delacruz is a 71 year old woman w/paroxysmal atrial fibrillation (Eliquis), HFrEF 2/2 NICM (<20%) s/p ICD (Biotronik 2024), HTN, DLP, ascites s/p paracentesis (4/4) who presents for an initial evaluation of her atrial fibrillation and ICD.  Ms. Delacruz underwent implantation of a Biotronik Actor 7 VR-T DX ICD with a Plexa SDX dual chamber lead on 8/29/24 by Dr. Sascha Gandara at [facility]. She was recently admitted with HF exacerbation, EF <20%. She was given IV diuresis and maintained on GDMT. She presents today looking to transition care over to our EP clinic from [facility]. Denies chest pain, palpitations, SOB, syncope or near syncope. She is aware she has atrial fibrillation, though she is unclear when she was first diagnosed with it. She maintains Eliquis for stroke prophylaxis.

## 2025-05-30 NOTE — HISTORY OF PRESENT ILLNESS
[FreeTextEntry1] : Martha Delacruz is a 71 year old woman w/paroxysmal atrial fibrillation (Eliquis), HFrEF 2/2 NICM (<20%) s/p ICD (Biotronik 2024), HTN, DLP, ascites s/p paracentesis (4/4) who presents for an initial evaluation of her atrial fibrillation and ICD.  Ms. Delacruz underwent implantation of a Biotronik Actor 7 VR-T DX ICD with a Plexa SDX dual chamber lead on 8/29/24 by Dr. Sascha Gandara at [facility]. She was recently admitted with HF exacerbation, EF <20%. She was given IV diuresis and maintained on GDMT. She presents today looking to transition care over to our EP clinic from [facility]. Denies chest pain, palpitations, SOB, syncope or near syncope. She is aware she has atrial fibrillation, though she is unclear when she was first diagnosed with it. She maintains Eliquis for stroke prophylaxis.

## 2025-05-30 NOTE — HISTORY OF PRESENT ILLNESS
[FreeTextEntry1] : Martha Delacruz is a 71 year old woman w/paroxysmal atrial fibrillation (Eliquis), HFrEF 2/2 NICM (<20%) s/p ICD (Biotronik 2024), HTN, DLP, ascites s/p paracentesis (4/4) who presents for an initial evaluation of her atrial fibrillation and ICD.  Ms. Delacruz underwent implantation of a Biotronik Actor 7 VR-T DX ICD with a Plexa SDX dual chamber lead on 8/29/24 by Dr. Sascha Gandara at [facility]. She was recently admitted with HF exacerbation, EF <20%. She was given IV diuresis and maintained on GDMT. She presents today looking to transition care over to our EP clinic from [facility]. Denies chest pain, palpitations, SOB, syncope or near syncope. She is aware she has atrial fibrillation, though she is unclear when she was first diagnosed with it. She maintains Eliquis for stroke prophylaxis.

## 2025-07-15 NOTE — PHYSICAL EXAM
[Frail] : frail [Normal] : clear lung fields, good air entry, no respiratory distress [No Edema] : no edema [No Cyanosis] : no cyanosis [No Rash] : no rash [No Skin Lesions] : no skin lesions [Moves all extremities] : moves all extremities [Alert and Oriented] : alert and oriented [de-identified] : JVP 8-10 cm H20 [de-identified] : distended [de-identified] : slow gait with walker

## 2025-07-15 NOTE — CARDIOLOGY SUMMARY
[de-identified] : -4/14/25 TTE: LVEF <20%, mildly enlarged RV size and reduced function, TAPSE 1.3cm, mod TR, PASP 51, small pericardial effusion, bilateral pleural effusion noted, IVC 1.1,   -4/3/25 TTE: EF 18%, global hypokinesis, reduced RV function, moderate MR, severe TR, severe pHTN (PASP 66mmHg)

## 2025-07-15 NOTE — DISCUSSION/SUMMARY
[FreeTextEntry1] : Ms Delacruz is a 72 y/o with h/o HFrEF (dx'ed in 2024, follows at [place]), s/p ICD (Plexa SDX dual chamber lead on 8/29/24), atrial fibrillation (Eliquis), HTN, DLP, ascites (s/p paracentesis 4/4), pHTN is here today for f/u for cardiomyopathy.  She is ACC/AHA Stage C, NYHA Class II-III, appears mildly volume up on exam, normotensive, likely her symptoms are multifactorial with her ongoing distention and cardiomyopathy.

## 2025-07-15 NOTE — HISTORY OF PRESENT ILLNESS
[FreeTextEntry1] : Ms Delacruz is a 70 y/o with h/o HFrEF (dx'ed in 2024, follows at [place]), s/p ICD (Plexa SDX dual chamber lead on 8/29/24), atrial fibrillation (Eliquis), HTN, DLP, ascites (s/p paracentesis 4/4), is here today for f/u for cardiomyopathy.   HPI: She initially presented to [facility] on 4/3/25 with worsening LE edema, abdominal distension and SOB. She states that she has been following with an outpt cardiologist (she doesn't recall his name). She has not been taking meds consistently. Her admission work up was remarkable for creatinine 1.5, pBNP 29,570, cardiomegaly on his CxR and mild pulm congestion. A TTE demonstrated LVEF 18% with biventricular dysfunction, mod MR, sev TR and severe pulm HTN. She was admitted and treated with IV diuretics with good response. She also underwent large volume paracentesis  of 12 liters on 4/4. Her hospital course was complicated by hypotension which triggered a transfer to [facility] 4/13. TTE shows pulmonary hypertension. Optimized on GDMT and diuretics. LFTs rising, prior US showed liver wnl and cholelithiasis.   Patient was d/c on 4/15 GDMTs: Toprol XL 25mg QD, spironolactone 25mg QD, losartan 12.5mg QD, lasix 40mg QD, farxiga 10mg QD. Labs 4/15/25: K3.6, BUN/Cr 20/0.96, Discharge weight on 4/15 - 123.2lb  Last visit with HF was 5/1/25 and has followed her other providers Dr Olivera, Dr. Koenig and Dr. Moore in the interim. She was increased of lasix to 40mg BID and losartan to 25mg QD at Dr. Moore visit in 5/29/25.  She states that she still uses her walker to walk around the house and denies any SOB, but states she is fatigue with her ongoing abdominal distention. She notes there are some improvement, but still is cumbersome and affecting her quality of life. Sleeps with 2 pillows at home. Has been monitoring her BP at home and has been 120s, weight around 120-121lb.   She denies any CP/palpitation, LH/dizziness, orthopnea, PND or BLE edema.   Denies any tobacco, ETOH or drug use. Retired . Denies any HF hospitalization prior to this recent.

## 2025-07-15 NOTE — ASSESSMENT
[FreeTextEntry1] : #HFrEF -Etiology: unclear baseline of her cardiomyopathy? was diagnosed in 2024 and got ICD placed at [place] -GDMT: currently taking Toprol XL 25mg QD, farxiga 10mg QD, losartan 25mg QD and spironolactone 25mg QD. Will increase spironolactone 25mg to BID to help augment K while increasing diuretics.  -Diuretics: will switch lasix to torsemide 60mg QD.  -Device: s/p ICD (2024) -Labs 5/2/25 proBNP 14556, K 4.1, BUN/Cr 20/0.97, LFTs downtrending AST/ALT/ALK (217/25/41) from (268/128/123) -will need repeat labs (will send home draw) -Emphasize importance of medication compliance and to monitor diet with high salt and high K -Will need to obtain records of her cardiac hx from [place].  -continued share care with Dr. Moore  #pHTN -f/u with Dr. Steph Haque -plan for repeat cath to evaluate pulmonary pessure?  #Afib -on eliquis 5mg BID, and BB -follows with EP at [place] in past? - will need records to be sent -f/u with Dr. Brennan Olivera -ongoing discussion for possible Afib?  #Ascites/Elevated LFTs -LFTs 4/15: , ,  -s/p paracentesis 4/4 -sent out urgent referral to f/u with hepatology for ongoing ascites -off atorvastatin and zetia i/s/o of elevated LFTs  Will f/u with HF ACP in 2-3 weeks to monitor volume status and GDMT optimization. Will see Dr. Thomas in 3-4 months

## 2025-07-15 NOTE — DISCUSSION/SUMMARY
[FreeTextEntry1] : Ms Delacruz is a 70 y/o with h/o HFrEF (dx'ed in 2024, follows at [place]), s/p ICD (Plexa SDX dual chamber lead on 8/29/24), atrial fibrillation (Eliquis), HTN, DLP, ascites (s/p paracentesis 4/4), pHTN is here today for f/u for cardiomyopathy.  She is ACC/AHA Stage C, NYHA Class II-III, appears mildly volume up on exam, normotensive, likely her symptoms are multifactorial with her ongoing distention and cardiomyopathy.

## 2025-07-15 NOTE — PHYSICAL EXAM
[Frail] : frail [Normal] : clear lung fields, good air entry, no respiratory distress [No Edema] : no edema [No Cyanosis] : no cyanosis [No Rash] : no rash [No Skin Lesions] : no skin lesions [Moves all extremities] : moves all extremities [Alert and Oriented] : alert and oriented [de-identified] : JVP 8-10 cm H20 [de-identified] : distended [de-identified] : slow gait with walker

## 2025-07-15 NOTE — ASSESSMENT
[FreeTextEntry1] : #HFrEF -Etiology: unclear baseline of her cardiomyopathy? was diagnosed in 2024 and got ICD placed at [place] -GDMT: currently taking Toprol XL 25mg QD, farxiga 10mg QD, losartan 25mg QD and spironolactone 25mg QD. Will increase spironolactone 25mg to BID to help augment K while increasing diuretics.  -Diuretics: will switch lasix to torsemide 60mg QD.  -Device: s/p ICD (2024) -Labs 5/2/25 proBNP 67575, K 4.1, BUN/Cr 20/0.97, LFTs downtrending AST/ALT/ALK (217/25/41) from (268/128/123) -will need repeat labs (will send home draw) -Emphasize importance of medication compliance and to monitor diet with high salt and high K -Will need to obtain records of her cardiac hx from [place].  -continued share care with Dr. Moore  #pHTN -f/u with Dr. Steph Haque -plan for repeat cath to evaluate pulmonary pessure?  #Afib -on eliquis 5mg BID, and BB -follows with EP at [place] in past? - will need records to be sent -f/u with Dr. Brennan Olivera -ongoing discussion for possible Afib?  #Ascites/Elevated LFTs -LFTs 4/15: , ,  -s/p paracentesis 4/4 -sent out urgent referral to f/u with hepatology for ongoing ascites -off atorvastatin and zetia i/s/o of elevated LFTs  Will f/u with HF ACP in 2-3 weeks to monitor volume status and GDMT optimization. Will see Dr. Thomas in 3-4 months

## 2025-07-15 NOTE — CARDIOLOGY SUMMARY
[de-identified] : -4/14/25 TTE: LVEF <20%, mildly enlarged RV size and reduced function, TAPSE 1.3cm, mod TR, PASP 51, small pericardial effusion, bilateral pleural effusion noted, IVC 1.1,   -4/3/25 TTE: EF 18%, global hypokinesis, reduced RV function, moderate MR, severe TR, severe pHTN (PASP 66mmHg)

## 2025-07-15 NOTE — HISTORY OF PRESENT ILLNESS
[FreeTextEntry1] : Ms Delacruz is a 70 y/o with h/o HFrEF (dx'ed in 2024, follows at [place]), s/p ICD (Plexa SDX dual chamber lead on 8/29/24), atrial fibrillation (Eliquis), HTN, DLP, ascites (s/p paracentesis 4/4), is here today for f/u for cardiomyopathy.   HPI: She initially presented to [facility] on 4/3/25 with worsening LE edema, abdominal distension and SOB. She states that she has been following with an outpt cardiologist (she doesn't recall his name). She has not been taking meds consistently. Her admission work up was remarkable for creatinine 1.5, pBNP 29,570, cardiomegaly on his CxR and mild pulm congestion. A TTE demonstrated LVEF 18% with biventricular dysfunction, mod MR, sev TR and severe pulm HTN. She was admitted and treated with IV diuretics with good response. She also underwent large volume paracentesis  of 12 liters on 4/4. Her hospital course was complicated by hypotension which triggered a transfer to [facility] 4/13. TTE shows pulmonary hypertension. Optimized on GDMT and diuretics. LFTs rising, prior US showed liver wnl and cholelithiasis.   Patient was d/c on 4/15 GDMTs: Toprol XL 25mg QD, spironolactone 25mg QD, losartan 12.5mg QD, lasix 40mg QD, farxiga 10mg QD. Labs 4/15/25: K3.6, BUN/Cr 20/0.96, Discharge weight on 4/15 - 123.2lb  Last visit with HF was 5/1/25 and has followed her other providers Dr Olivera, Dr. Koenig and Dr. Moore in the interim. She was increased of lasix to 40mg BID and losartan to 25mg QD at Dr. Moore visit in 5/29/25.  She states that she still uses her walker to walk around the house and denies any SOB, but states she is fatigue with her ongoing abdominal distention. She notes there are some improvement, but still is cumbersome and affecting her quality of life. Sleeps with 2 pillows at home. Has been monitoring her BP at home and has been 120s, weight around 120-121lb.   She denies any CP/palpitation, LH/dizziness, orthopnea, PND or BLE edema.   Denies any tobacco, ETOH or drug use. Retired . Denies any HF hospitalization prior to this recent.

## 2025-07-29 NOTE — DISCUSSION/SUMMARY
[FreeTextEntry1] : Ms Delacruz is a 72 y/o with h/o HFrEF (dx'ed in 2024, follows at [place]), s/p ICD (Plexa SDX dual chamber lead on 8/29/24), atrial fibrillation (Eliquis), HTN, DLP, ascites (s/p paracentesis 4/4), pHTN is here today for f/u for cardiomyopathy.  She is ACC/AHA Stage C, NYHA Class II-III, appears mildly volume up on exam, normotensive, likely her symptoms are multifactorial with her ongoing distention and cardiomyopathy.

## 2025-07-29 NOTE — DISCUSSION/SUMMARY
[FreeTextEntry1] : Ms Delacruz is a 70 y/o with h/o HFrEF (dx'ed in 2024, follows at [place]), s/p ICD (Plexa SDX dual chamber lead on 8/29/24), atrial fibrillation (Eliquis), HTN, DLP, ascites (s/p paracentesis 4/4), pHTN is here today for f/u for cardiomyopathy.  She is ACC/AHA Stage C, NYHA Class II-III, appears mildly volume up on exam, normotensive, likely her symptoms are multifactorial with her ongoing distention and cardiomyopathy.

## 2025-07-29 NOTE — DISCUSSION/SUMMARY
[FreeTextEntry1] : Ms Delacruz is a 70 y/o with h/o HFrEF (dx'ed in 2024, follows at [place]), s/p ICD (Plexa SDX dual chamber lead on 8/29/24), atrial fibrillation (Eliquis), HTN, DLP, ascites (s/p paracentesis 4/4), pHTN is here today for f/u for cardiomyopathy.  She is ACC/AHA Stage C, NYHA Class II-III, appears mildly volume up on exam, normotensive, likely her symptoms are multifactorial with her ongoing distention and cardiomyopathy.

## 2025-08-18 NOTE — HISTORY OF PRESENT ILLNESS
[FreeTextEntry1] : Ms Delacruz is a 72 y/o with h/o HFrEF (dx'ed in 2024, follows at [place]), s/p ICD (Plexa SDX dual chamber lead on 8/29/24), atrial fibrillation (Eliquis), HTN, DLP, ascites (s/p paracentesis 4/4), is here today for f/u for cardiomyopathy.   HPI: She initially presented to [facility] on 4/3/25 with worsening LE edema, abdominal distension and SOB. She states that she has been following with an outpt cardiologist (she doesn't recall his name). She has not been taking meds consistently. Her admission work up was remarkable for creatinine 1.5, pBNP 29,570, cardiomegaly on his CxR and mild pulm congestion. A TTE demonstrated LVEF 18% with biventricular dysfunction, mod MR, sev TR and severe pulm HTN. She was admitted and treated with IV diuretics with good response. She also underwent large volume paracentesis  of 12 liters on 4/4. Her hospital course was complicated by hypotension which triggered a transfer to [facility] 4/13. TTE shows pulmonary hypertension. Optimized on GDMT and diuretics. LFTs rising, prior US showed liver wnl and cholelithiasis.   Patient was d/c on 4/15 GDMTs: Toprol XL 25mg QD, spironolactone 25mg QD, losartan 12.5mg QD, lasix 40mg QD, farxiga 10mg QD. Labs 4/15/25: K3.6, BUN/Cr 20/0.96, Discharge weight on 4/15 - 123.2lb Presents today for routine follow up, at last visit lasix was changed to torsemide 60mg daily and estela was increased to 25mg bid. She reports no sob, orthopnea, sleeps on 2 pillows for comfort, or PND. No chest pain, palpitations, dizziness or lightheadedness, no ICD discharges. No noted LE edema.  Her main complaint is fatigue related to her abdominal distention. wgts stabe, today she is down 5# from last visit, SBPs at home in the 110's-120's  Denies any tobacco, ETOH or drug use. Retired .

## 2025-08-18 NOTE — CARDIOLOGY SUMMARY
[de-identified] : -4/14/25 TTE: LVEF <20%, mildly enlarged RV size and reduced function, TAPSE 1.3cm, mod TR, PASP 51, small pericardial effusion, bilateral pleural effusion noted, IVC 1.1,   -4/3/25 TTE: EF 18%, global hypokinesis, reduced RV function, moderate MR, severe TR, severe pHTN (PASP 66mmHg)

## 2025-08-18 NOTE — PHYSICAL EXAM
[Frail] : frail [Normal] : clear lung fields, good air entry, no respiratory distress [No Edema] : no edema [No Cyanosis] : no cyanosis [No Rash] : no rash [No Skin Lesions] : no skin lesions [Moves all extremities] : moves all extremities [Alert and Oriented] : alert and oriented [de-identified] : JVP 6-8 cm H20 [de-identified] : distended [de-identified] : slow gait with walker

## 2025-08-18 NOTE — CARDIOLOGY SUMMARY
[de-identified] : -4/14/25 TTE: LVEF <20%, mildly enlarged RV size and reduced function, TAPSE 1.3cm, mod TR, PASP 51, small pericardial effusion, bilateral pleural effusion noted, IVC 1.1,   -4/3/25 TTE: EF 18%, global hypokinesis, reduced RV function, moderate MR, severe TR, severe pHTN (PASP 66mmHg)

## 2025-08-18 NOTE — PHYSICAL EXAM
[Frail] : frail [Normal] : clear lung fields, good air entry, no respiratory distress [No Edema] : no edema [No Cyanosis] : no cyanosis [No Rash] : no rash [No Skin Lesions] : no skin lesions [Moves all extremities] : moves all extremities [Alert and Oriented] : alert and oriented [de-identified] : JVP 6-8 cm H20 [de-identified] : distended [de-identified] : slow gait with walker

## 2025-08-18 NOTE — HISTORY OF PRESENT ILLNESS
[FreeTextEntry1] : Ms Delacruz is a 70 y/o with h/o HFrEF (dx'ed in 2024, follows at [place]), s/p ICD (Plexa SDX dual chamber lead on 8/29/24), atrial fibrillation (Eliquis), HTN, DLP, ascites (s/p paracentesis 4/4), is here today for f/u for cardiomyopathy.   HPI: She initially presented to [facility] on 4/3/25 with worsening LE edema, abdominal distension and SOB. She states that she has been following with an outpt cardiologist (she doesn't recall his name). She has not been taking meds consistently. Her admission work up was remarkable for creatinine 1.5, pBNP 29,570, cardiomegaly on his CxR and mild pulm congestion. A TTE demonstrated LVEF 18% with biventricular dysfunction, mod MR, sev TR and severe pulm HTN. She was admitted and treated with IV diuretics with good response. She also underwent large volume paracentesis  of 12 liters on 4/4. Her hospital course was complicated by hypotension which triggered a transfer to [facility] 4/13. TTE shows pulmonary hypertension. Optimized on GDMT and diuretics. LFTs rising, prior US showed liver wnl and cholelithiasis.   Patient was d/c on 4/15 GDMTs: Toprol XL 25mg QD, spironolactone 25mg QD, losartan 12.5mg QD, lasix 40mg QD, farxiga 10mg QD. Labs 4/15/25: K3.6, BUN/Cr 20/0.96, Discharge weight on 4/15 - 123.2lb Presents today for routine follow up, at last visit lasix was changed to torsemide 60mg daily and estela was increased to 25mg bid. She reports no sob, orthopnea, sleeps on 2 pillows for comfort, or PND. No chest pain, palpitations, dizziness or lightheadedness, no ICD discharges. No noted LE edema.  Her main complaint is fatigue related to her abdominal distention. wgts stabe, today she is down 5# from last visit, SBPs at home in the 110's-120's  Denies any tobacco, ETOH or drug use. Retired .

## 2025-08-18 NOTE — PHYSICAL EXAM
[Frail] : frail [Normal] : clear lung fields, good air entry, no respiratory distress [No Edema] : no edema [No Cyanosis] : no cyanosis [No Rash] : no rash [No Skin Lesions] : no skin lesions [Moves all extremities] : moves all extremities [Alert and Oriented] : alert and oriented [de-identified] : JVP 6-8 cm H20 [de-identified] : distended [de-identified] : slow gait with walker

## 2025-08-18 NOTE — ASSESSMENT
[FreeTextEntry1] : Ms Delacruz is a 70 y/o with h/o HFrEF (dx'ed in 2024, follows at [place]), s/p ICD (Plexa SDX dual chamber lead on 8/29/24), atrial fibrillation (Eliquis), HTN, DLP, ascites (s/p paracentesis 4/4), pHTN is here today for f/u for cardiomyopathy.  She is ACC/AHA Stage C, NYHA Class II-III  normotensive, and near euvolemic on exam likely her symptoms are multifactorial with her ongoing abdominal distention and cardiomyopathy.   #HFrEF -Etiology: unclear baseline of her cardiomyopathy? was diagnosed in 2024 and got ICD placed at [place] -GDMT: currently taking Toprol XL 25mg QD, farxiga 10mg QD, losartan 25mg QD and spironolactone 25mg BID.  -Diuretics: Continue torsemide 60mg QD.  -Device: s/p ICD (2024) -Labs  Today -Emphasize importance of medication compliance and to monitor diet with high salt and high K  #pHTN -f/u with Dr. Steph Haque -plan for repeat cath to evaluate pulmonary pessure?  #Afib -on eliquis 5mg BID, and BB -follows with EP at [place] in past? - will need records to be sent -f/u with Dr. Brennan Olivera -ongoing discussion for possible Afib?  #Ascites/Elevated LFTs -LFTs 4/15: , ,  -s/p paracentesis 4/4 - Stressed f/u with hepatology -off atorvastatin and zetia i/s/o of elevated LFTs  RTC with Dr. Thomas as scheduled

## 2025-08-18 NOTE — ASSESSMENT
[FreeTextEntry1] : Ms Delacruz is a 70 y/o with h/o HFrEF (dx'ed in 2024, follows at [place]), s/p ICD (Plexa SDX dual chamber lead on 8/29/24), atrial fibrillation (Eliquis), HTN, DLP, ascites (s/p paracentesis 4/4), pHTN is here today for f/u for cardiomyopathy.  She is ACC/AHA Stage C, NYHA Class II-III  normotensive, and near euvolemic on exam likely her symptoms are multifactorial with her ongoing abdominal distention and cardiomyopathy.   #HFrEF -Etiology: unclear baseline of her cardiomyopathy? was diagnosed in 2024 and got ICD placed at [place] -GDMT: currently taking Toprol XL 25mg QD, farxiga 10mg QD, losartan 25mg QD and spironolactone 25mg BID.  -Diuretics: Continue torsemide 60mg QD.  -Device: s/p ICD (2024) -Labs  Today -Emphasize importance of medication compliance and to monitor diet with high salt and high K  #pHTN -f/u with Dr. Steph Haque -plan for repeat cath to evaluate pulmonary pessure?  #Afib -on eliquis 5mg BID, and BB -follows with EP at [place] in past? - will need records to be sent -f/u with Dr. Brennan Olivera -ongoing discussion for possible Afib?  #Ascites/Elevated LFTs -LFTs 4/15: , ,  -s/p paracentesis 4/4 - Stressed f/u with hepatology -off atorvastatin and zetia i/s/o of elevated LFTs  RTC with Dr. Thomas as scheduled

## 2025-08-18 NOTE — ASSESSMENT
[FreeTextEntry1] : Ms Delacruz is a 72 y/o with h/o HFrEF (dx'ed in 2024, follows at [place]), s/p ICD (Plexa SDX dual chamber lead on 8/29/24), atrial fibrillation (Eliquis), HTN, DLP, ascites (s/p paracentesis 4/4), pHTN is here today for f/u for cardiomyopathy.  She is ACC/AHA Stage C, NYHA Class II-III  normotensive, and near euvolemic on exam likely her symptoms are multifactorial with her ongoing abdominal distention and cardiomyopathy.   #HFrEF -Etiology: unclear baseline of her cardiomyopathy? was diagnosed in 2024 and got ICD placed at [place] -GDMT: currently taking Toprol XL 25mg QD, farxiga 10mg QD, losartan 25mg QD and spironolactone 25mg BID.  -Diuretics: Continue torsemide 60mg QD.  -Device: s/p ICD (2024) -Labs  Today -Emphasize importance of medication compliance and to monitor diet with high salt and high K  #pHTN -f/u with Dr. Steph Haque -plan for repeat cath to evaluate pulmonary pessure?  #Afib -on eliquis 5mg BID, and BB -follows with EP at [place] in past? - will need records to be sent -f/u with Dr. Brennan Olivera -ongoing discussion for possible Afib?  #Ascites/Elevated LFTs -LFTs 4/15: , ,  -s/p paracentesis 4/4 - Stressed f/u with hepatology -off atorvastatin and zetia i/s/o of elevated LFTs  RTC with Dr. Thomas as scheduled

## 2025-08-18 NOTE — CARDIOLOGY SUMMARY
[de-identified] : -4/14/25 TTE: LVEF <20%, mildly enlarged RV size and reduced function, TAPSE 1.3cm, mod TR, PASP 51, small pericardial effusion, bilateral pleural effusion noted, IVC 1.1,   -4/3/25 TTE: EF 18%, global hypokinesis, reduced RV function, moderate MR, severe TR, severe pHTN (PASP 66mmHg)

## 2025-08-18 NOTE — HISTORY OF PRESENT ILLNESS
[FreeTextEntry1] : Ms Delacruz is a 72 y/o with h/o HFrEF (dx'ed in 2024, follows at [place]), s/p ICD (Plexa SDX dual chamber lead on 8/29/24), atrial fibrillation (Eliquis), HTN, DLP, ascites (s/p paracentesis 4/4), is here today for f/u for cardiomyopathy.   HPI: She initially presented to [facility] on 4/3/25 with worsening LE edema, abdominal distension and SOB. She states that she has been following with an outpt cardiologist (she doesn't recall his name). She has not been taking meds consistently. Her admission work up was remarkable for creatinine 1.5, pBNP 29,570, cardiomegaly on his CxR and mild pulm congestion. A TTE demonstrated LVEF 18% with biventricular dysfunction, mod MR, sev TR and severe pulm HTN. She was admitted and treated with IV diuretics with good response. She also underwent large volume paracentesis  of 12 liters on 4/4. Her hospital course was complicated by hypotension which triggered a transfer to [facility] 4/13. TTE shows pulmonary hypertension. Optimized on GDMT and diuretics. LFTs rising, prior US showed liver wnl and cholelithiasis.   Patient was d/c on 4/15 GDMTs: Toprol XL 25mg QD, spironolactone 25mg QD, losartan 12.5mg QD, lasix 40mg QD, farxiga 10mg QD. Labs 4/15/25: K3.6, BUN/Cr 20/0.96, Discharge weight on 4/15 - 123.2lb Presents today for routine follow up, at last visit lasix was changed to torsemide 60mg daily and estela was increased to 25mg bid. She reports no sob, orthopnea, sleeps on 2 pillows for comfort, or PND. No chest pain, palpitations, dizziness or lightheadedness, no ICD discharges. No noted LE edema.  Her main complaint is fatigue related to her abdominal distention. wgts stabe, today she is down 5# from last visit, SBPs at home in the 110's-120's  Denies any tobacco, ETOH or drug use. Retired .